# Patient Record
(demographics unavailable — no encounter records)

---

## 2020-01-01 NOTE — PDOC.NEO
- Subjective


Did well on HFNC overnight. FiO2 weaned. Work of breathing improved. Decreased 

to low flow NC this am and did well. IV access lost, started PO feeds and 

changed last dose of antibiotics to IM.








- Objective


Delivery Weight: 3.85 kg


Current Weight: 3.82 kg


Age: 0m 1d








Vital Signs (24 Hours): 


 Vital Signs (24 hours)











  Temp Pulse Resp BP Pulse Ox


 


 20 12:45      100


 


 20 11:20      100


 


 20 11:00   130  60   99


 


 20 09:45      100


 


 20 09:30      97


 


 20 08:07      100


 


 20 08:00  98.3 F  136  52  75/46  100


 


 20 05:40   118  50   97


 


 20 02:30  98.5 F  130  46   98


 


 20 01:55      99


 


 20 23:50   144  32   98


 


 20 19:58      100


 


 20 19:30  99.1 F  122  42  82/56  99


 


 20 18:00   140  52   94


 


 20 17:01  98.9 F     100


 


 20 15:13      95


 


 20 15:00  99.7 F H  108  44   97








 Nursery Blood Pressure Mean











Nursery Blood Pressure Mean [  58





Supine]                        














I&O (24 Hours): 


 





IO Intake/Output (/Infant)                     Start:  20 08:03


Freq:   Q3HR                                          Status: Active        


Protocol:                                                                   











  20





  15:00 17:27 19:30


 


NB Intake/Output   


 


Diaper (gm=ml) 37 42 44


 


Number of Urine Diapers 1 1 1


 


Number of Bowel Movement Diapers (  1 1





diapers)   


 


Total, Output Amount (ml) 37 42 44














  20





  23:50 02:30 05:35


 


NB Intake/Output   


 


Diaper (gm=ml) 63 47 68


 


Number of Urine Diapers 1 1 1


 


Number of Bowel Movement Diapers ( 1 1 1





diapers)   


 


Total, Output Amount (ml) 63 47 68














  20





  09:00 13:05


 


NB Intake/Output  


 


Diaper (gm=ml) 20 12


 


Number of Urine Diapers 1 1


 


Number of Bowel Movement Diapers (  





diapers)  


 


Total, Output Amount (ml) 20 12











 











 20





 06:59 06:59


 


Intake Total  237.51


 


Output Total  348


 


Balance  -110.49


 


Intake:  


 


  Intake, IV Amount  237.51


 


    Ampicillin 385 mg SLOW  6.93





    IVP Q12HR [facility] Rx#:  





    [id number]  


 


    Dextrose 10% in Water 250  227.5





    ml @ 10 mls/hr IV .Q24H  





    ELPIDIO Rx#:[id number]  


 


    Gentamicin (PEDI) 15.4 mg  3.08





    In Sodium Chloride 0.9%  





    1.54 ml @ 3.08 mls/hr  





    IVPB Q24HR [facility]IO Rx#:  





    [id number]  


 


Output:  


 


  Diaper (gm=ml)  348 (3.8mL/kg/hr)


 


Other:  


 


  Breast Feeding - Right  





  Side (min.)  


 


  Breast Feeding - Left  





  Side (min.)  


 


  # Urine Diapers  x7


 


  # Bowel Movement Diapers  x5


 


  Weight  3.82 kg (down 30 grams)











Physical Exam:





HEENT: AFOSF, MMM





Lungs: CTAB, comfortable





CV: RRR, no murmur, 2+ femoral pulses





ABD: soft, non distended, +bowel sounds











- Laboratory


  Labs











  20





  09:55 14:01


 


POC Glucose   65


 


Total Bilirubin  7.6 H 


 


Direct Bilirubin  0.4 











(1) Observation and evaluation of  for suspected infectious condition


Code(s): Z05.1 - OBS & EVAL OF NB FOR SUSPECTED INFECT CONDITION RULED OUT   

Status: Acute   





(2) Respiratory distress of 


Code(s): P22.9 - RESPIRATORY DISTRESS OF , UNSPECIFIED   Status: Acute   





(3) Respiratory failure in 


Code(s): P28.5 - RESPIRATORY FAILURE OF    Status: Acute   





(4) Term  delivered vaginally, current hospitalization


Code(s): Z38.00 - SINGLE LIVEBORN INFANT, DELIVERED VAGINALLY   Status: Acute   


This is a 38 5/7 week female who requires NICU critical care





Resp: We started HFNC 5 lpm on admission to the NICU and she needed FiO2 0.40 

to get her saturations into the 90s. Down to 25% by am of 3/24, switched to low 

flow cannula with 100% and weaning flow for appropriate saturations.





CV: Normal exam, good BP and perfusion.  





FEN/GI: Her admission blood sugar was 51. She iwas initially NPO with D10W at 

60 ml/kg/d. Stopped IVF on 3/24 when IV access lost. PO ad kayode breastfeeding or 

EBM.





Heme: Maternal and baby blood type A+. Bilirubin at 28 hours of life was 7.6/0.3

, HIR. Repeat on 3/25.





ID: Suspected sepsis due to respiratory distress, CBC is reassuring. Blood 

culture no growth, receiving empiric ampicillin and gentamicin pending results. 





Discharge planning: NBS #1 at 36 hours, CCHD screen, Hep B vaccine, and hearing 

screen before discharge.

## 2020-01-01 NOTE — PDOC.PED
Subjective:


Doing well this morning, no concerns from mom. Feeding well, about every 2-

3hrs. Milk is coming in per mom. 3 dirty and >5 wet diapers since admission. 

Normal activity, no cough, congestion, fever, sob. Tolerating phototherapy well.








Objective:


 Vital Signs (12 hours)











  Temp Pulse Resp


 


 20 04:40  98.3 F  140  36


 


 20 00:35  98.5 F  144  40


 


 20 20:10  98.0 F  150  44








 Weight











Weight                         3.515 kg














 











 20





 06:59 06:59 06:59


 


Intake Total   110


 


Balance   110














Phys Exam





- Physical Examination


Constitutional: NAD (resting comfortably)


HEENT: moist MMs (anterior fontonelle open and flat)


Neck: supple


Respiratory: no wheezing, no rales, no rhonchi, clear to auscultation bilateral


Cardiovascular: RRR, no significant murmur, no rub


Gastrointestinal: soft, non-tender, positive bowel sounds


Neurological: moves all 4 limbs





Assessment/Plan:


(1) Hyperbilirubinemia requiring phototherapy


Code(s): P59.9 -  JAUNDICE, UNSPECIFIED   Status: Acute   


TAGA F born @ 38 5/7 weeks to a 28 year old  now 2, complicated by 

shoulder dystocia and NICU stay, admitted for rebound hyperbilirubinemia





#Rebound Hyperbilirubinemia


- Initially placed on phototherapy during birth hospitalization, responded to 

phototherapy appropriately and discharged home


- repeat bili on day of admission was 17.5 @ 125HOL, HIR, lights threshold of 18

, admitted for phototherapy


- sepsis workup at birth negative, given 2d of abx. Had  respiratory 

distress at birth, resolved, did not require intubation


- Started on lights @ ~1430 on 3/28


- Cont lights for 24hrs and recheck bili @ 1430 today


- Encourage breastfeeding


- Monitor I's/O's and daily weights





PCP: Anam





Diet: Breast ad kayode





Dispo: Admitted for rebound hyperbilirubinemia. Cont phototherapy 24hrs, 

recheck bili this afternoon. Encourage feeding. Anticipate discharge this PM vs 

tomorrow AM pending clinical course.








Addendum - Attending





- Attending Attestation


Date/Time: 20 3513





I personally evaluated the patient and discussed the management with Dr. Mckeon


I agree with the History, Examination, Assessment and Plan documented above 

with any addition or exceptions noted below.





Recheck bili after 24 hr phototherapy then d/c home.

## 2020-01-01 NOTE — PDOC.NEOAD
- History





I was called to this delivery due to shoulder dystocia, arrived when the baby 

was ~1 minute old.





Baby Radha Arboleda was born at 38 5/7 weeks to a 28 year old G 5 P 1031 

mom who had good prenatal care with Dr. Lawton. Pregnancy was unremarkable. 

 labs showed blood type A+, antibody screen negative, Hep B negative, 

RPR NR, HIV negative, Rubella immune, GBS negative, chlamydia negative, and GC 

negative. Mom went into labor early this morning. She delivered very quickly 

after admission to L&D. There was shoulder dystocia and she was initially stuck 

in the birth canal but Dr. Lawton was able to deliver her within 30 seconds. She 

cried soon after delivery but did not pink up well. At 4 minutes her 

saturations were in the 60s-low 70s so we gave blow by O2 for ~2 minutes and 

her saturations came up to the upper 80s. She had good respiratory effort with 

no grunting or retractions but her saturations went to the low 70s off O2. We 

gave face mask CPAP with FiO2 0.21 and her saturations came up to 100 within 30 

seconds. We gave CPAP for ~2 minutes and then took in away and her saturations 

went to the low 80s. We gave CPAP again with FiO2 0.21 and her saturations came 

up to 100. We took the CPAP away and her saturations were 90-91. We gave CPAP 

again and afterwards her saturations were 92-93 so we let her go to Mom. When 

the nurse went to check on her 10 minutes later she was grunting. She put her 

on the pulse ox and her saturations were in the 70s so she was admitted to the 

NICU for respiratory distress and failure.





- Vital Signs





Temp: 98.6             HR: 155   RR: 26               BP: 88/50 (72) 





Wt: 3.850 kg      FOC: 33.5 cm      L: 48 cm





Admit Physical Exam: 





HEENT:  AF soft and flat, ears in appropriate position without pits or tags, 

PERRL, RR OU, palate intact, neck supple, HFNC in place


Lungs: Clear breath sounds with good air movement bilaterally 


CVS:  RRR, nl S1, S2, no murmur


Abdomen: Soft, no masses or distention, 3 vessel cord


Genitalia: Normal female


Anus: Patent 


Hips: No clunks


Extremities:  FROM


Neurological: Normal for gestation


Skin: No lesions





- Diagnoses


Patient Problems: 


 Problem List











Problem Status Onset


 


Observation and evaluation of  for suspected infectious condition Acute 


 


Respiratory distress of  Acute 


 


Respiratory failure in  Acute 


 


Term  delivered vaginally, current hospitalization Acute 











Plan: 





This is a 38 5/7 week female who requires NICU critical care





Resp: We started HFNC 5 lpm on admission to the NICU and she needed FiO2 0.40 

to get her saturations into the 90s. She is breathing easily and her grunting 

has resolved. We will adjust the FiO2 to keep her saturations 95 or greater.





CV: Normal exam, good BP and perfusion.  





FEN/GI: Her admission blood sugar was 51. She is initially NPO. We will start 

D10W at 60 ml/kg/d.





Heme: Maternal blood type A+, baby pending. We are sending a CBC. We will check 

her bilirubin at 36 hours of life.





ID: Suspected sepsis due to respiratory distress, we will send a CBC and blood 

culture and start ampicillin and gentamicin pending results. 





Discharge planning: NBS #1 at 36 hours, CCHD screen, Hep B vaccine, and hearing 

screen before discharge.

## 2020-01-01 NOTE — RAD
Exam: Chest one view



HISTORY:Term . Respiratory distress.



Comparison: None



FINDINGS:

Lines and tubes: Orogastric tube terminates in the stomach.

Cardiac silhouette:Normal cardiothymic silhouette.

Aorta: Unremarkable

Pulmonary vessels: Normal

Costophrenic angles: Clear



LUNGS: Adequate aeration right lung. Questionable left lung opacity.

Pneumothorax: None



Osseous abnormalities: None



IMPRESSION: 

1. Possible left lung opacities.

2. Orogastric tube terminating in this stomach.



Reported By: Allie Gomez 

Electronically Signed:  2020 8:13 AM

## 2020-01-01 NOTE — PDOC.NEO
- Subjective


Failed room air trial overnight. Mom at bedside and updated. 








- Objective


Delivery Weight: 3.85 kg


Current Weight: 3.605 kg


Age: 0m 2d








Vital Signs (24 Hours): 


 Vital Signs (24 hours)











  Temp Pulse Resp BP Pulse Ox


 


 20 10:35   106  52   98


 


 20 08:45      100


 


 20 07:30  98.8 F  120  48  64/36 L  98


 


 20 04:15  98.8 F  137  47   100


 


 20 01:00  98.4 F  136  54   100


 


 20 22:00   132  59   100


 


 20 19:15  100.1 F H  128  36  67/37  100


 


 20 18:45      100


 


 20 17:30   128  40   98


 


 20 15:45      100


 


 20 15:16      100


 


 20 14:25  98.6 F  136  48   100








 Nursery Blood Pressure Mean











Nursery Blood Pressure Mean [  47





Supine]                        














I&O (24 Hours): 


 





IO Intake/Output (/Infant)                     Start:  20 08:03


Freq:   Q3HR                                          Status: Active        


Protocol:                                                                   











  20





  13:05 15:00 20:00


 


NB Intake/Output   


 


Diaper (gm=ml) 12  


 


Number of Urine Diapers 1 1 1


 


Number of Bowel Movement Diapers (   1





diapers)   


 


Total, Output Amount (ml) 12  














  20





  22:00 01:00


 


NB Intake/Output  


 


Diaper (gm=ml)  


 


Number of Urine Diapers 1 1


 


Number of Bowel Movement Diapers ( 1 1





diapers)  


 


Total, Output Amount (ml)  











 











 20





 06:59 06:59


 


Intake Total 237.51 50


 


Output Total 348 32


 


Balance -110.49 18


 


Intake:  


 


  Intake, IV Amount 237.51 50


 


    Ampicillin 385 mg SLOW 6.93 





    IVP Q12HR [facility] Rx#:  





    [id number]  


 


    Dextrose 10% in Water 250 227.5 50





    ml @ 10 mls/hr IV .Q24H  





    [facility] Rx#:[id number]  


 


    Gentamicin (PEDI) 15.4 mg 3.08 





    In Sodium Chloride 0.9%  





    1.54 ml @ 3.08 mls/hr  





    IVPB Q24HR [facility] Rx#:  





    [id number]  


 


Output:  


 


  Diaper (gm=ml) 348 32


 


Other:  


 


  Breast Feeding - Right  15





  Side (min.)  


 


  Breast Feeding - Left  10





  Side (min.)  


 


  # Urine Diapers 1 x6


 


  # Bowel Movement Diapers 1 x3


 


  Weight 3.82 kg 3.605 kg (down 215 grams)











Physical Exam:





HEENT: AFOSF, MMM





Lungs: CTAB, comfortable





CV: RRR, no murmur, 2+ femoral pulses





ABD: soft, non distended, +bowel sounds








(1) Observation and evaluation of  for suspected infectious condition


Code(s): Z05.1 - OBS & EVAL OF NB FOR SUSPECTED INFECT CONDITION RULED OUT   

Status: Ruled-out   





(2) Respiratory distress of 


Code(s): P22.9 - RESPIRATORY DISTRESS OF , UNSPECIFIED   Status: Acute   





(3) Respiratory failure in 


Code(s): P28.5 - RESPIRATORY FAILURE OF    Status: Resolved   





(4) Term  delivered vaginally, current hospitalization


Code(s): Z38.00 - SINGLE LIVEBORN INFANT, DELIVERED VAGINALLY   Status: Acute   


This is a 38 5/7 week female who requires NICU intensive care





Resp: We started HFNC 5 lpm on admission to the NICU and she needed FiO2 0.40 

to get her saturations into the 90s. Down to 25% by am of 3/24, switched to low 

flow cannula with 100% and weaning flow for appropriate saturations. Room air 

trial today.





CV: Normal exam, good BP and perfusion.  





FEN/GI: Her admission blood sugar was 51. She iwas initially NPO with D10W at 

60 ml/kg/d. Stopped IVF on 3/24 when IV access lost. PO ad kayode breastfeeding or 

EBM.





Heme: Maternal and baby blood type A+. Bilirubin at 28 hours of life was 7.6/0.3

, HIR. Repeat on 3/25.





ID: Suspected sepsis due to respiratory distress, CBC is reassuring. Blood 

culture no growth, receiving empiric ampicillin and gentamicin pending results. 





Discharge planning: NBS #1 sent 3/24, CCHD screen, Hep B vaccine, and hearing 

screen before discharge.

## 2020-01-01 NOTE — PDOC.FPRHP
- History of Present Illness


Chief Complaint: Hyperbilirubinemia 


History of Present Illness: 





5d F born at 38 5/7 weeks to a 28 year old G 5 P 1031. Pregnancy was 

unremarkable.  labs showed blood type A+, antibody screen negative, 

Hep B negative, RPR NR, HIV negative, Rubella immune, GBS negative, chlamydia 

negative, and GC negative. Delivery was complicated by shoulder dystocia that 

lasted 30 sec. After delivery she was transferred to NICU dt respiratory 

distress. She never required intubation. She was worked up for sepsis and 

received 2 days of abx. Sepsis work up was negative. Her  course was 

also complicated by hyperbilirubinemia and was treated with 24 hours of lights. 

Today a repeat bili was HI and within 2 of light cut off due to moderate risk. 

Baby is currently breastfeeding 15 min per side q2h. Making multiple wet and 

dirty diapers per day. 





PMHx


 respiratory distress


hyperbilibinemia 





FHx


non contributory 














- Allergies/Adverse Reactions


 Allergies











Allergy/AdvReac Type Severity Reaction Status Date / Time


 


No Known Allergies Allergy   Unverified 20 07:28














- Home Medications


 











 Medication  Instructions  Recorded  Confirmed  Type


 


No Known  20 History














- Review of Systems


General: denies: fever/chills, weight/appetite/sleep changes


ENT: denies: rhinorrhea


Respiratory: denies: cough, congestion


Cardiovascular: reports: other (denies cyanosis)


Gastrointestinal: denies: vomiting, diarrhea


Skin: reports: jaundice





- Physical Exam


Constitutional: NAD


HEENT: normocephalic and atraumatic, other (mild scleral icterus)


Chest: no lesions


Heart: RRR


Lungs: no respiratory distress


Abdomen: soft, non-tender, bowel sounds present, no masses/distention


Musculoskeletal: normal structure


Skin: other (jaundice to include face and chest)


Heme/Lymphatic: no purpura, no petechia





FMR H&P: Results





- Labs


Lab results: 





 Laboratory Tests











  20





  11:42


 


Total Bilirubin  17.3 H


 


Direct Bilirubin  0.5














FMR H&P: A/P





- Problem List


(1) Hyperbilirubinemia requiring phototherapy


Status: Acute   Code(s): P59.9 -  JAUNDICE, UNSPECIFIED   





- Plan





1. Hyperbilirubinemia 


- admit for phototherapy.


- recheck bili in 24 hours.


- continue to feed q2h





FMR H&P: Upper Level





- Plan


Date/Time: 20 5638








I, [], have evaluated this patient and agree with findings/plan as outlined by 

intern resident. Pertinent changes/additions are listed here.








Addendum - Attending





- Attending Attestation


Date/Time: 20 1927





I personally evaluated the patient and discussed the management with Dr. Olivia


I agree with the History, Examination, Assessment and Plan documented above 

with any addition or exceptions noted below.

## 2020-01-01 NOTE — DIS
DATE OF ADMISSION:  2020



DATE OF DISCHARGE:  2020



RESIDENT:  Ronn Mckeon MD



ADMITTING ATTENDING:  Simba Garcia MD



DISCHARGE ATTENDING:  Simba Garcia MD



CONSULTS:  None.



PROCEDURES:  Dual bank phototherapy for 24 hours.



PRIMARY DIAGNOSIS:  Rebound hyperbilirubinemia of the .



SECONDARY DIAGNOSES:  Born at 38.5 weeks.  Delivery complicated by shoulder dystocia

and respiratory distress of the , requiring NICU stay with no intubation. 



DISCHARGE MEDICATIONS:  None.



HISTORY OF PRESENT ILLNESS AND HOSPITAL COURSE:  The patient is a TAGA female, born

at 38.5 weeks to a 28-year-old, G5, P1-0-3-1, now 2, delivery was complicated by

shoulder dystocia, NICU stay, and respiratory distress at birth that did not require

intubation.  The patient had a sepsis workup and received two days of antibiotics,

and this was negative at birth.  The patient was then discharged home after having

phototherapy during her initial stay.  Upon repeat bilirubin check with her primary

care provider, it was found to be 17.5 at 125 hours of life, placing the patient in

high intermediate risk with a light threshold of 18.  Thus, the patient was admitted

for dual bank phototherapy. 



On initial exam, the patient was well appearing.  Vital signs were stable and was

breastfeeding well.  Mom was encouraged to continue resting at the hospitalization.

At the hospitalization, the patient voided and stooled well.  Vital signs were

stable, and was feeding well.  The patient was placed on phototherapy.  Of note, at

multiple visits in the room, the patient was noted to be off dual bank phototherapy

and in bed with mother.  She was encouraged to allow the patient to stay under

phototherapy for effective treatment.  After approximately 24 hours on phototherapy,

repeat bilirubin was checked and found to be 15.7.  This was out of the bilirubin to

range, however, was below the threshold of phototherapy of 18 with a medium risk

baby; however, the patient was safe for discharge home to follow up with primary

care physician this week.  Return precautions were given.  Discharge plan was

discussed with patient's mother at bedside, who voiced agreement understanding of

this plan.  All questions were answered appropriately.  The patient was then

discharged home under the care of mother. 



DISPOSITION:  Stable.



DISCHARGE INSTRUCTIONS:  

1. Location:  Home.

2. Diet:  Breast fed ad kayode.

3. Activity:  As tolerated.

4. Followup:  The patient to follow up with primary care provider within the next

week following discharge. 







Job ID:  [id number]

## 2020-01-01 NOTE — PDOC.NEODC
- History





I was called to this delivery due to shoulder dystocia, arrived when the baby 

was ~1 minute old.





Baby Robles, Radha Delgado was born at 38 5/7 weeks to a 28 year old G 5 P 1031 

mom who had good prenatal care with Dr. Lawton. Pregnancy was unremarkable. 

 labs showed blood type A+, antibody screen negative, Hep B negative, 

RPR NR, HIV negative, Rubella immune, GBS negative, chlamydia negative, and GC 

negative. Mom went into labor early this morning. She delivered very quickly 

after admission to L&D. There was shoulder dystocia and she was initially stuck 

in the birth canal but Dr. Lawton was able to deliver her within 30 seconds. She 

cried soon after delivery but did not pink up well. At 4 minutes her 

saturations were in the 60s-low 70s so we gave blow by O2 for ~2 minutes and 

her saturations came up to the upper 80s. She had good respiratory effort with 

no grunting or retractions but her saturations went to the low 70s off O2. We 

gave face mask CPAP with FiO2 0.21 and her saturations came up to 100 within 30 

seconds. We gave CPAP for ~2 minutes and then took in away and her saturations 

went to the low 80s. We gave CPAP again with FiO2 0.21 and her saturations came 

up to 100. We took the CPAP away and her saturations were 90-91. We gave CPAP 

again and afterwards her saturations were 92-93 so we let her go to Mom. When 

the nurse went to check on her 10 minutes later she was grunting. She put her 

on the pulse ox and her saturations were in the 70s so she was admitted to the 

NICU for respiratory distress and failure.





- Admission Vital Signs


 











Temp Pulse Resp BP Pulse Ox


 


 98.6 F   155   104 H  88/50   91 


 


 20 07:13  20 07:13  20 07:13  20 07:13  20 07:13














- Admission Physical Exam


Admit Measurements: 





Wt: 3.850 kg      FOC: 33.5 cm      L: 48 cm








HEENT:  AF soft and flat, ears in appropriate position without pits or tags, 

PERRL, RR OU, palate intact, neck supple, HFNC in place


Lungs: Clear breath sounds with good air movement bilaterally 


CVS:  RRR, nl S1, S2, no murmur


Abdomen: Soft, no masses or distention, 3 vessel cord


Genitalia: Normal female


Anus: Patent 


Hips: No clunks


Extremities:  FROM


Neurological: Normal for gestation


Skin: No lesions





- Discharge Physical Exam


 Discharge Measurements











Weight                         3.455 kg


 


Length                         48 cm


 


 Head Circumference     33.5














Physical Exam:





HEENT: AFOSF, MMM, ears in appropriate position





Lungs: CTAB, comfortable





CV: RRR, no murmur, 2+ femoral pulses





ABD: soft, non distended, +bowel sounds





Ext: moving all well, hips stable





: normal female genitalia











- Diagnoses


Patient Problems: 


 Problem List











Problem Status Onset


 


Hyperbilirubinemia requiring phototherapy Acute 


 


Term  delivered vaginally, current hospitalization Acute 


 


Respiratory failure in  Resolved 


 


Observation and evaluation of  for suspected infectious condition Ruled-

out 


 


Respiratory distress of  Ruled-out 














- Hospital Course


This is a 38 5/7 week female who required NICU intensive care for:





Resp: We started HFNC 5 lpm on admission to the NICU and she needed FiO2 0.40 

to get her saturations into the 90s. Down to 25% by am of 3/24, switched to low 

flow cannula with 100% and weaning flow for appropriate saturations. To room 

air the morning of 3/25 and did well throughout the remainder of admission.





CV: Normal exam, good BP and perfusion.  





FEN/GI: Her admission blood sugar was 51. She was initially NPO with D10W at 60 

ml/kg/d. Stopped IVF on 3/24 when IV access lost. PO ad kayode breastfeeding or 

EBM. At the time of discharge her weight was 10% down from birthweight but mom 

started pumping after each breastfeeding and giving the volume (~10-20mL per 

feed). Appropriate urine and stool. 





Heme: Maternal and baby blood type A+. Bilirubin at 28 hours of life was 7.6/0.3

, HIR. Repeat on 3/25 was high risk at 14.2/0.4, started on phototherapy. 

Repeat am of 3/26 was 12.8/0.4 at 72 hours of life. Continued phototherapy 

until the afternoon then discharged home.





ID: Suspected sepsis due to respiratory distress, CBC is reassuring. Blood 

culture no growth, received empiric ampicillin and gentamicin x 48 hours.  





Discharge planning: NBS #1 sent 3/24, CCHD screen passed, Hep B vaccine 3/23, 

and hearing screen passed bilaterally before discharge. To follow up with Dr. Mendieta on 3/27.